# Patient Record
Sex: MALE | Race: WHITE
[De-identification: names, ages, dates, MRNs, and addresses within clinical notes are randomized per-mention and may not be internally consistent; named-entity substitution may affect disease eponyms.]

---
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
## 2021-03-01 NOTE — RAD REPORT
EXAM DESCRIPTION:  CTAbdomen   Pelvis W Contrast - 3/1/2021 3:42 pm

 

CLINICAL HISTORY:  Abdominal pain.

epigastric pain, vomiting, diarrhea

 

COMPARISON:  No comparisons

 

TECHNIQUE:  Biphasic CT imaging of the abdomen and pelvis was performed with 100 ml non-ionic IV cont
rast.

 

All CT scans are performed using dose optimization technique as appropriate and may include automated
 exposure control or mA/KV adjustment according to patient size.

 

FINDINGS:  The lung bases are clear.

 

The liver, spleen, pancreas, adrenal glands and kidneys are within normal limits.

 

No bowel obstruction, free air, free fluid or abscess.  The appendix is normal.  No evidence of signi
ficant lymphadenopathy.

 

No suspicious bony findings.

 

IMPRESSION:  No acute intra-abdominal or pelvic finding.

## 2021-03-01 NOTE — ER
Nurse's Notes                                                                                     

 [facility]                                                                 

Name: Lisandro Pierce                                                                             

Age: 20 yrs                                                                                       

Sex: Male                                                                                         

: 2000                                                                                   

MRN: [id number]                                                                                   

Arrival Date: 2021                                                                          

Time: 14:27                                                                                       

Account#: [id number]                                                                            

Bed 25                                                                                            

Private MD:                                                                                       

Diagnosis: Upper abdominal pain, unspecified                                                      

                                                                                                  

Presentation:                                                                                     

                                                                                             

14:42 Chief complaint: Patient states: Upper abdominal pain > 1 week. As soon as after I eat  ca1 

      I get diarrhea and vomiting. Coronavirus screen: Client denies travel out of the [place]       

      in the last 14 days. diarrhea, vomiting. Client presents with at least one sign or          

      symptom that may indicate coronavirus-19. Standard/surgical mask placed on the client.      

      Provider contacted for isolation considerations. Ebola Screen: Patient negative for         

      fever greater than or equal to 101.5 degrees Fahrenheit, and additional compatible          

      Ebola Virus Disease symptoms Patient denies exposure to infectious person. Patient          

      denies travel to an Ebola-affected area in the 21 days before illness onset. No             

      symptoms or risks identified at this time. Initial Sepsis Screen: Does the patient meet     

      any 2 criteria? No. Patient's initial sepsis screen is negative. Does the patient have      

      a suspected source of infection? No. Patient's initial sepsis screen is negative. Risk      

      Assessment: Do you want to hurt yourself or someone else? Patient reports no desire to      

      harm self or others. Onset of symptoms was 2021.                                  

14:42 Method Of Arrival: Ambulatory                                                           ca1 

14:42 Acuity: ADELINA 3                                                                           ca1 

                                                                                                  

Historical:                                                                                       

- Allergies:                                                                                      

14:43 No Known Allergies;                                                                     ca1 

- Home Meds:                                                                                      

14:43 None [Active];                                                                          ca1 

- PMHx:                                                                                           

14:43 Hypertension;                                                                           ca1 

- PSHx:                                                                                           

14:43 None;                                                                                   ca1 

                                                                                                  

- Immunization history:: Adult Immunizations unknown.                                             

- Social history:: Smoking status: Reported history of juuling and/or vaping.                     

                                                                                                  

                                                                                                  

Screening:                                                                                        

15:23 Abuse screen: Denies threats or abuse. Denies injuries from another. Nutritional        zb  

      screening: Has had N/V for 3 or more days. Tuberculosis screening: No symptoms or risk      

      factors identified. Fall Risk None identified.                                              

                                                                                                  

Assessment:                                                                                       

15:20 General: Appears in no apparent distress. uncomfortable, Behavior is calm, cooperative, zb  

      appropriate for age, Reports feeling ill for > 3 days, Denies fever, fatigue, chills.       

      Pain: Complains of pain in left upper quadrant and right upper quadrant and epigastric      

      area Pain does not radiate. Quality of pain is described as aching, tender, Pain began      

      last Monday Aggravated by eating, drinking. Neuro: Level of Consciousness is awake,         

      alert, obeys commands, Oriented to person, place, time, situation. Cardiovascular:          

      Reports None Heart tones S1 S2 present Capillary refill < 3 seconds in bilateral            

      Patient's skin is warm and dry. Rhythm is regular. Respiratory: Airway is patent            

      Respiratory effort is even, unlabored, Respiratory pattern is regular, symmetrical,         

      Breath sounds are clear bilaterally. Denies cough. GI: Abdomen is flat, Bowel sounds        

      present X 4 quads. hyperactive in right upper quadrant, left upper quadrant, right          

      lower quadrant and left lower quadrant Abdomen is tender to palpation in epigastric         

      area, right upper quadrant and left upper quadrant Reports upper abdominal pain,            

      diarrhea, intolerance of fluids, intolerance of food, nausea, vomiting, since Last          

      Monday. : No signs and/or symptoms were reported regarding the genitourinary system.      

      EENT: No signs and/or symptoms were reported regarding the EENT system. Derm: Skin is       

      intact, is healthy with good turgor, Skin is dry, Skin is normal, Skin temperature is       

      warm. Musculoskeletal: Circulation, motion, and sensation intact. Range of motion:          

      intact in all extremities.                                                                  

16:16 Reassessment: Patient appears in no apparent distress at this time. Patient and/or      zb  

      family updated on plan of care and expected duration. Pain level reassessed. Patient is     

      alert, oriented x 3, equal unlabored respirations, skin warm/dry/pink. no changes at        

      this time. IV fluids continue to infuse.                                                    

16:41 Reassessment: patient gait even and steady. d/c instruction given no questions at this  zb  

      time.                                                                                       

                                                                                                  

Vital Signs:                                                                                      

14:42  / 81; Pulse 96; Resp 16 S; Temp 98(TE); Pulse Ox 100% on R/A; Weight 113.4 kg    ca1 

      (R); Height 6 ft. 2 in. (187.96 cm) (R); Pain 2/10;                                         

16:16  / 74; Pulse 69; Resp 16; Pulse Ox 100% on R/A;                                   zb  

14:42 Body Mass Index 32.10 (113.40 kg, 187.96 cm)                                            ca1 

                                                                                                  

ED Course:                                                                                        

14:27 Patient arrived in ED.                                                                  ds1 

14:43 Triage completed.                                                                       ca1 

14:43 Arm band placed on right wrist.                                                         ca1 

14:44 Meliton Centeno PA is PHCP.                                                              [facility] 

14:44 Pepe Astorga MD is Attending Physician.                                                [facility] 

14:50 Caron Madrid, MARCK is Primary Nurse.                                                   zb  

15:20 Inserted saline lock: 20 gauge in left antecubital area, using aseptic technique. Blood zb  

      collected.                                                                                  

15:23 Patient has correct armband on for positive identification. Bed in low position. Call   zb  

      light in reach. Side rails up X 1. Pulse ox on. NIBP on. Door closed. Noise minimized.      

15:42 CT Abd/Pelvis - IV Contrast Only In Process Unspecified.                                EDMS

16:23 Terri Barnes MD is Referral Physician.                                                  [facility] 

16:41 No provider procedures requiring assistance completed. IV discontinued, intact,         zb  

      bleeding controlled, No redness/swelling at site. Pressure dressing applied.                

                                                                                                  

Administered Medications:                                                                         

15:20 Drug: NS 0.9% 1000 ml Route: IV; Rate: 1 bolus; Site: left antecubital;                 zb  

15:20 Drug: Zofran (Ondansetron) 4 mg Route: IVP; Site: left antecubital;                     zb  

                                                                                                  

                                                                                                  

Outcome:                                                                                          

16:24 Discharge ordered by MD.                                                                [facility] 

16:41 Discharged to home ambulatory.                                                          zb  

16:41 Condition: stable                                                                           

16:41 Discharge instructions given to patient, Instructed on discharge instructions, follow       

      up and referral plans. medication usage, Demonstrated understanding of instructions,        

      follow-up care, medications, Prescriptions given X 3.                                       

16:42 Patient left the ED.                                                                    zb  

                                                                                                  

Signatures:                                                                                       

Dispatcher [employer]                           EDMS                                                 

Meliton Centeno PA PA jmm Sanford, Demi                                ds1                                                  

Mindy See RN                        RN   ca1                                                  

Caron Madrid RN                     RN   zb                                                   

                                                                                                  

**************************************************************************************************

## 2021-03-01 NOTE — EDPHYS
Physician Documentation                                                                           

 [facility]                                                                 

Name: Lisandro Pierce                                                                             

Age: 20 yrs                                                                                       

Sex: Male                                                                                         

: 2000                                                                                   

MRN: [id number]                                                                                   

Arrival Date: 2021                                                                          

Time: 14:27                                                                                       

Account#: [id number]                                                                            

Bed 25                                                                                            

Private MD:                                                                                       

ED Physician Pepe Astorga                                                                         

HPI:                                                                                              

                                                                                             

14:50 This 20 yrs old  Male presents to ER via Ambulatory with complaints of         m 

      Abdominal Pain.                                                                             

14:50 The patient presents with abdominal pain. Onset: The symptoms/episode began/occurred    jmm 

      gradually, 1 week(s) ago. The symptoms do not radiate. Associated signs and symptoms:       

      Pertinent positives: nausea and vomiting, diarrhea. The symptoms are described as achy.     

      Modifying factors: The symptoms are alleviated by nothing, the symptoms are aggravated      

      by movement. The patient has not experienced similar symptoms in the past. Denies any       

      recent travel or abx use.                                                                   

                                                                                                  

Historical:                                                                                       

- Allergies:                                                                                      

14:43 No Known Allergies;                                                                     ca1 

- Home Meds:                                                                                      

14:43 None [Active];                                                                          ca1 

- PMHx:                                                                                           

14:43 Hypertension;                                                                           ca1 

- PSHx:                                                                                           

14:43 None;                                                                                   ca1 

                                                                                                  

- Immunization history:: Adult Immunizations unknown.                                             

- Social history:: Smoking status: Reported history of juuling and/or vaping.                     

                                                                                                  

                                                                                                  

ROS:                                                                                              

14:50 Constitutional: Negative for fever, chills, and weight loss, Cardiovascular: Negative   jmm 

      for chest pain, palpitations, and edema, Respiratory: Negative for shortness of breath,     

      cough, wheezing, and pleuritic chest pain.                                                  

14:50 Abdomen/GI: Positive for abdominal pain, nausea and vomiting, diarrhea.                     

14:50 All other systems are negative.                                                             

                                                                                                  

Exam:                                                                                             

14:50 Constitutional:  This is a well developed, well nourished patient who is awake, alert,  jmm 

      and in no acute distress. Head/Face:  atraumatic. Eyes:  EOMI, no conjunctival erythema     

      appreciated ENT:  Moist Mucus Membranes Neck:  Trachea midline, Supple Chest/axilla:        

      Normal chest wall appearance and motion.   Cardiovascular:  Regular rate and rhythm.        

      No edema appreciated Respiratory:  Normal respirations, no respiratory distress             

      appreciated                                                                                 

14:50 Back:  Normal ROM Skin:  General appearance color normal MS/ Extremity:  Moves all          

      extremities, no obvious deformities appreciated, no edema noted to the lower                

      extremities  Neuro:  Awake and alert, normal gait Psych:  Behavior is normal, Mood is       

      normal, Patient is cooperative and pleasant                                                 

14:50 Abdomen/GI: Inspection: abdomen appears normal, Bowel sounds: normal, Palpation: soft,      

      mild abdominal tenderness, in the epigastric area, right upper quadrant and left upper      

      quadrant.                                                                                   

                                                                                                  

Vital Signs:                                                                                      

14:42  / 81; Pulse 96; Resp 16 S; Temp 98(TE); Pulse Ox 100% on R/A; Weight 113.4 kg    ca1 

      (R); Height 6 ft. 2 in. (187.96 cm) (R); Pain 2/10;                                         

16:16  / 74; Pulse 69; Resp 16; Pulse Ox 100% on R/A;                                   zb  

14:42 Body Mass Index 32.10 (113.40 kg, 187.96 cm)                                            ca1 

                                                                                                  

MDM:                                                                                              

14:46 Patient medically screened.                                                             [facility] 

16:22 Data reviewed: vital signs, nurses notes. Counseling: I had a detailed discussion with  danita 

      the patient and/or guardian regarding: the historical points, exam findings, and any        

      diagnostic results supporting the discharge/admit diagnosis, lab results, radiology         

      results, the need for outpatient follow up, to return to the emergency department if        

      symptoms worsen or persist or if there are any questions or concerns that arise at          

      home. ED course: Patient is alert and non toxic in appearance in the ED. CT negative.       

      Patient is given strict return precautions. patient understood and agrees with the plan     

      of care. .                                                                                  

                                                                                                  

                                                                                             

14:50 Order name: Basic Metabolic Panel; Complete Time: 16:19                                 [facility] 

                                                                                             

14:50 Order name: CBC with Diff; Complete Time: 16:11                                         [facility] 

                                                                                             

14:50 Order name: Hepatic Function; Complete Time: 16:19                                      [facility] 

                                                                                             

14:50 Order name: Lipase; Complete Time: 16:19                                                [facility] 

                                                                                             

14:50 Order name: CT Abd/Pelvis - IV Contrast Only; Complete Time: 16:15                      [facility] 

                                                                                             

15:44 Order name: Urine Dipstick--Ancillary (enter results); Complete Time: 15:57             [facility] 

                                                                                             

14:50 Order name: IV Saline Lock; Complete Time: 15:20                                        [facility] 

                                                                                             

14:50 Order name: Labs collected and sent; Complete Time: 15:20                               [facility] 

                                                                                             

14:50 Order name: Urine Dipstick-Ancillary (obtain specimen); Complete Time: 15:31            [facility] 

                                                                                                  

Administered Medications:                                                                         

15:20 Drug: NS 0.9% 1000 ml Route: IV; Rate: 1 bolus; Site: left antecubital;                 zb  

15:20 Drug: Zofran (Ondansetron) 4 mg Route: IVP; Site: left antecubital;                     zb  

                                                                                                  

                                                                                                  

Disposition:                                                                                      

18:32 Co-signature as Attending Physician, Pepe Astorga MD.                                    rn  

                                                                                                  

Disposition:                                                                                      

21 16:24 Discharged to Home. Impression: Upper abdominal pain, unspecified.                 

- Condition is Stable.                                                                            

- Discharge Instructions: Abdominal Pain, Adult.                                                  

- Prescriptions for Zofran ODT 4 mg Oral tablet,disintegrating - place 1 tablet by                

  TRANSLINGUAL route every 4-6 hours; 20 tablet. Bentyl 20 mg Oral Tablet - take 2                

  tablet by ORAL route every 6 hours As needed; 40 tablet. Pepcid 20 mg Oral Tablet -             

  take 1 tablet by ORAL route every 12 hours for 10 days; 20 tablet.                              

- Medication Reconciliation Form, Thank You Letter, Antibiotic Education, Prescription            

  Opioid Use form.                                                                                

- Follow up: Terri Barnes MD; When: 2 - 3 days; Reason: Recheck today's complaints,               

  Continuance of care, Re-evaluation by your physician.                                           

                                                                                                  

                                                                                                  

                                                                                                  

Signatures:                                                                                       

Dispatcher [employer]                           EDMS                                                 

Meliton Centeno PA PA jmm Nieto, Roman, MD MD rn Acob, MARCK Guillen RN, Zipporah, RN RN zb                                                   

                                                                                                  

Corrections: (The following items were deleted from the chart)                                    

16:42 16:24 2021 16:24 Discharged to Home. Impression: Upper abdominal pain,            zb  

      unspecified. Condition is Stable. Forms are Medication Reconciliation Form, Thank You       

      Letter, Antibiotic Education, Prescription Opioid Use. Follow up: Terri aBrnes; When: 2 -     

      3 days; Reason: Recheck today's complaints, Continuance of care, Re-evaluation by your      

      physician. rose                                                                              

                                                                                                  

**************************************************************************************************

## 2021-04-29 NOTE — EDPHYS
Physician Documentation                                                                           

 [facility]                                                                 

Name: Lisandro Pierce                                                                             

Age: 20 yrs                                                                                       

Sex: Male                                                                                         

: 2000                                                                                   

MRN: [id number]                                                                                   

Arrival Date: 2021                                                                          

Time: 00:50                                                                                       

Account#: [id number]                                                                            

Bed 8                                                                                             

Private MD:                                                                                       

ED Physician Jorge Howard                                                                             

HPI:                                                                                              

                                                                                             

02:38 This 20 yrs old  Male presents to ER via Ambulatory with complaints of Sore    pkl 

      Throat.                                                                                     

02:38 The patient describes throat pain as constant. Onset: The symptoms/episode              pkl 

      began/occurred 1 week(s) ago.                                                               

                                                                                                  

Historical:                                                                                       

- Allergies:                                                                                      

01:07 No Known Allergies;                                                                     tl1 

- Home Meds:                                                                                      

01:07 None [Active];                                                                          tl1 

- PMHx:                                                                                           

01:07 Hypertension;                                                                           tl1 

- PSHx:                                                                                           

01:07 None;                                                                                   tl1 

                                                                                                  

- Immunization history:: Adult Immunizations up to date.                                          

- Social history:: Smoking status: Patient denies any tobacco usage or history of.                

                                                                                                  

                                                                                                  

ROS:                                                                                              

02:38 Eyes: Negative for injury, pain, redness, and discharge.                                pkl 

02:38 ENT: Positive for sore throat.                                                              

02:38 Neck: Negative for stiffness.                                                               

02:38 Cardiovascular: Negative for chest pain.                                                    

02:38 Respiratory: Negative for cough, shortness of breath.                                       

02:38 Abdomen/GI: Negative for abdominal pain, nausea, vomiting, and diarrhea.                    

02:38 Back: Negative for acute changes.                                                           

02:38 : Negative for urinary symptoms.                                                          

02:38 MS/extremity: Negative for acute changes.                                                   

02:38 Skin: Negative for rash.                                                                    

02:38 Neuro: Negative for altered mental status.                                                  

                                                                                                  

Exam:                                                                                             

02:38 Head/Face:  Normocephalic, atraumatic. Eyes:  Pupils equal round and reactive to light, pkl 

      extra-ocular motions intact.  Lids and lashes normal.  Conjunctiva and sclera are           

      non-icteric and not injected.  Cornea within normal limits.  Periorbital areas with no      

      swelling, redness, or edema.                                                                

02:38 ENT: Posterior pharynx: erythema, that is mild.                                             

02:38 Neck: Exam negative for nuchal rigidity.                                                    

02:38 Chest/axilla: Exam negative for acute changes.                                              

02:38 Cardiovascular: Rate: normal, Rhythm: regular.                                              

02:38 Respiratory: the patient does not display signs of respiratory distress,  Respirations:     

      normal, Breath sounds: are clear throughout.                                                

02:38 Abdomen/GI: Bowel sounds: normal, Palpation: abdomen is soft and non-tender, in all         

      quadrants.                                                                                  

02:38 Back: Exam negative for acute changes.                                                      

02:38 : Exam negative for acute changes.                                                        

02:38 Musculoskeletal/extremity: Exam is negative for acute changes.                              

02:38 Skin: Exam negative for rash.                                                               

02:38 Neuro: Orientation: is normal, Mentation: is normal, Cranial nerves: grossly normal,        

      Motor: is normal.                                                                           

                                                                                                  

Vital Signs:                                                                                      

01:06  / 85; Pulse 89; Resp 16; Temp 98.1; Pulse Ox 98% ; Weight 117.93 kg; Height 6    tl1 

      ft. 2 in. (187.96 cm); Pain 4/10;                                                           

02:30  / 86; Pulse 90; Resp 16; Pulse Ox 98% on R/A;                                    jb4 

01:06 Body Mass Index 33.38 (117.93 kg, 187.96 cm)                                            tl1 

                                                                                                  

MDM:                                                                                              

01:45 Patient medically screened.                                                             pkl 

02:38 Data reviewed: vital signs, nurses notes, lab test result(s).                           pk 

                                                                                                  

                                                                                             

01:12 Order name: Flu                                                                         tl1 

                                                                                             

01:13 Order name: Group A Streptococcus Rapid Sc; Complete Time: 02:43                        EDMS

                                                                                             

02:18 Order name: COVID-19/FLU A+B; Complete Time: 02:37                                      EDMS

                                                                                             

02:39 Order name: Throat Culture                                                              EDMS

                                                                                                  

Administered Medications:                                                                         

02:52 Drug: Acyclovir 800 mg Route: PO;                                                       jb4 

02:52 Follow up: Response: Medication administered at discharge.                              jb4 

                                                                                                  

                                                                                                  

Disposition:                                                                                      

21 02:42 Discharged to Home. Impression: Pharyngitis.                                       

- Condition is Stable.                                                                            

                                                                                                  

- Prescriptions for Acyclovir 400 mg Oral Tablet - take 1 tablet by ORAL route 3 times            

  per day; 20 tablet.                                                                             

- Medication Reconciliation Form, Thank You Letter, Antibiotic Education, Prescription            

  Opioid Use form.                                                                                

- Follow up: Private Physician; When: 2 - 3 days; Reason: Re-evaluation by your                   

  physician.                                                                                      

- Problem is new.                                                                                 

- Symptoms are unchanged.                                                                         

                                                                                                  

                                                                                                  

                                                                                                  

Signatures:                                                                                       

Dispatcher [employer]                           EDMS                                                 

Jorge Howard MD MD   pkl                                                  

Alisa Pizano RN                      RN   tl1                                                  

Ted Ramírez RN                       RN   jb4                                                  

                                                                                                  

Corrections: (The following items were deleted from the chart)                                    

01:35 01:13 Group A Streptococcus Rapid Sc+[id number].LAB.[id number] ordered. EDMS                           EDMS

01:36 01:13 Influenza Screen (A ordered. EDMS                                                 EDMS

02:55 02:42 2021 02:42 Discharged to Home. Impression: Pharyngitis. Condition is        jb4 

      Stable. Forms are Medication Reconciliation Form, Thank You Letter, Antibiotic              

      Education, Prescription Opioid Use. Follow up: Private Physician; When: 2 - 3 days;         

      Reason: Re-evaluation by your physician. Problem is new. Symptoms are unchanged. pkl        

                                                                                                  

**************************************************************************************************

## 2021-04-29 NOTE — ER
Nurse's Notes                                                                                     

 [facility]                                                                 

Name: Lisandro Pierce                                                                             

Age: 20 yrs                                                                                       

Sex: Male                                                                                         

: 2000                                                                                   

MRN: [id number]                                                                                   

Arrival Date: 2021                                                                          

Time: 00:50                                                                                       

Account#: [id number]                                                                            

Bed 8                                                                                             

Private MD:                                                                                       

Diagnosis: Pharyngitis                                                                            

                                                                                                  

Presentation:                                                                                     

                                                                                             

01:05 Chief complaint: Patient states: sore throat for several days. Coronavirus screen:      tl1 

      Client denies travel out of the [place] in the last 14 days. At this time, the client does     

      not indicate any symptoms associated with coronavirus-19. Ebola Screen: Patient             

      negative for fever greater than or equal to 101.5 degrees Fahrenheit, and additional        

      compatible Ebola Virus Disease symptoms Patient denies exposure to infectious person.       

      Patient denies travel to an Ebola-affected area in the 21 days before illness onset.        

      Initial Sepsis Screen: Does the patient meet any 2 criteria? No. Patient's initial          

      sepsis screen is negative. Does the patient have a suspected source of infection? No.       

      Patient's initial sepsis screen is negative. Risk Assessment: Do you want to hurt           

      yourself or someone else? Patient reports no desire to harm self or others. Onset of        

      symptoms is unknown.                                                                        

01:05 Method Of Arrival: Ambulatory                                                           tl1 

01:05 Acuity: ADELINA 4                                                                           tl1 

                                                                                                  

Historical:                                                                                       

- Allergies:                                                                                      

01:07 No Known Allergies;                                                                     tl1 

- Home Meds:                                                                                      

01:07 None [Active];                                                                          tl1 

- PMHx:                                                                                           

01:07 Hypertension;                                                                           tl1 

- PSHx:                                                                                           

01:07 None;                                                                                   tl1 

                                                                                                  

- Immunization history:: Adult Immunizations up to date.                                          

- Social history:: Smoking status: Patient denies any tobacco usage or history of.                

                                                                                                  

                                                                                                  

Screenin:09 Abuse screen: Denies threats or abuse. Denies injuries from another. Nutritional        tl1 

      screening: No deficits noted. Tuberculosis screening: No symptoms or risk factors           

      identified. Fall Risk None identified.                                                      

                                                                                                  

Assessment:                                                                                       

01:08 General: Appears in no apparent distress. Pain: Complains of pain in left aspect of     tl1 

      posterior pharynx and right aspect of posterior pharynx Pain currently is 4 out of 10       

      on a pain scale. Aggravated by swallowing. Neuro: No deficits noted. Cardiovascular:        

      Denies chest pain. Respiratory: Airway is patent Respiratory effort is even, unlabored,     

      Breath sounds are clear bilaterally. GI: No signs and/or symptoms were reported             

      involving the gastrointestinal system. : No signs and/or symptoms were reported           

      regarding the genitourinary system. EENT: Throat is reddened Reports pain when              

      swallowing. Derm: No deficits noted. Musculoskeletal: No deficits noted.                    

02:53 Reassessment: Patient appears in no apparent distress at this time. Patient and/or      jb4 

      family updated on plan of care and expected duration. Pain level reassessed. Patient is     

      alert, oriented x 3, equal unlabored respirations, skin warm/dry/pink.                      

                                                                                                  

Vital Signs:                                                                                      

01:06  / 85; Pulse 89; Resp 16; Temp 98.1; Pulse Ox 98% ; Weight 117.93 kg; Height 6    tl1 

      ft. 2 in. (187.96 cm); Pain 4/10;                                                           

02:30  / 86; Pulse 90; Resp 16; Pulse Ox 98% on R/A;                                    jb4 

01:06 Body Mass Index 33.38 (117.93 kg, 187.96 cm)                                            tl1 

                                                                                                  

ED Course:                                                                                        

00:50 Patient arrived in ED.                                                                  cf2 

01:06 Triage completed.                                                                       tl1 

01:08 Arm band placed on right wrist.                                                         tl1 

01:09 Jared Coronel, RN is Primary Nurse.                                                       

01:09 Patient has correct armband on for positive identification. Bed in low position. Call     

      light in reach. Side rails up X 1. Pulse ox on. NIBP on.                                    

01:45 Jorge Howard MD is Attending Physician.                                                    pkl 

02:30 No provider procedures requiring assistance completed. Patient did not have IV access   jb4 

      during this emergency room visit.                                                           

                                                                                                  

Administered Medications:                                                                         

02:52 Drug: Acyclovir 800 mg Route: PO;                                                       jb4 

02:52 Follow up: Response: Medication administered at discharge.                              jb4 

                                                                                                  

                                                                                                  

Outcome:                                                                                          

02:42 Discharge ordered by MD.                                                                pkl 

02:54 Discharged to home ambulatory.                                                          jb4 

02:54 Condition: stable                                                                           

02:54 Discharge instructions given to patient, Instructed on discharge instructions, follow       

      up and referral plans. medication usage, Demonstrated understanding of instructions,        

      follow-up care, medications, Prescriptions given X 1.                                       

02:55 Patient left the ED.                                                                    jb4 

                                                                                                  

Signatures:                                                                                       

Jorge Howard MD MD   pkAlisa Caballero RN RN   tl1                                                  

Ted Ramírez RN RN   jb4                                                  

Jared Coronel RN RN                                                      

Richter, Celesta                             cf2                                                  

                                                                                                  

**************************************************************************************************

## 2022-03-15 NOTE — ER
Nurse's Notes                                                                                     

 [facility]                                                                 

Name: Lisandro Pierce                                                                             

Age: 21 yrs                                                                                       

Sex: Male                                                                                         

: 2000                                                                                   

MRN: [id number]                                                                                   

Arrival Date: 03/15/2022                                                                          

Time: 15:05                                                                                       

Account#: [id number]                                                                            

Bed 8                                                                                             

Private MD:                                                                                       

Diagnosis: Influenza due to identified novel influenza A virus with other respiratory             

  manifestations                                                                                  

                                                                                                  

Presentation:                                                                                     

03/15                                                                                             

15:16 Chief complaint: Patient states: "I am sick. I've been coughing, fever, sore throat,    ab2 

      body aches and chills for 3 days." Pt denies n/v/d. Coronavirus screen: Vaccine status:     

      Patient reports receiving the 2nd dose of the covid vaccine. chills, cough unrelated to     

      allergies, fatigue, fever, runny nose, shortness of breath, sore throat, At this time,      

      the client does not indicate any symptoms associated with coronavirus-19. Ebola Screen:     

      Patient negative for fever greater than or equal to 101.5 degrees Fahrenheit, and           

      additional compatible Ebola Virus Disease symptoms Patient denies exposure to               

      infectious person. Patient denies travel to an Ebola-affected area in the 21 days           

      before illness onset. No symptoms or risks identified at this time. Initial Sepsis          

      Screen: Does the patient meet any 2 criteria? No. Patient's initial sepsis screen is        

      negative. Does the patient have a suspected source of infection? No. Patient's initial      

      sepsis screen is negative. Risk Assessment: Do you want to hurt yourself or someone         

      else? Patient reports no desire to harm self or others. Onset of symptoms is unknown.       

15:16 Method Of Arrival: Ambulatory                                                           ab2 

15:16 Acuity: ADELINA 4                                                                           ab2 

                                                                                                  

Triage Assessment:                                                                                

15:19 General: Appears in no apparent distress. comfortable, Behavior is calm, cooperative,   ab2 

      appropriate for age. Pain: Complains of pain in neck. EENT: Reports pain when               

      swallowing. Respiratory: Reports shortness of breath cough that is Airway is patent         

      Respiratory effort is even, unlabored, Respiratory pattern is regular, symmetrical.         

      Musculoskeletal: Reports body aches.                                                        

                                                                                                  

Historical:                                                                                       

- Allergies:                                                                                      

15:19 No Known Allergies;                                                                     ab2 

- Home Meds:                                                                                      

15:19 None [Active];                                                                          ab2 

- PMHx:                                                                                           

15:19 Hypertension;                                                                           ab2 

- PSHx:                                                                                           

15:19 None;                                                                                   ab2 

                                                                                                  

- Immunization history:: Adult Immunizations up to date.                                          

- Social history:: Smoking status: Patient denies any tobacco usage or history of.                

                                                                                                  

                                                                                                  

Screenin:07 Abuse screen: Denies threats or abuse. Nutritional screening: No deficits noted.        vg1 

      Tuberculosis screening: No symptoms or risk factors identified. Fall Risk No fall in        

      past 12 months (0 pts). No secondary diagnosis (0 pts). No IV (0 pts). Ambulatory Aid-      

      None/Bed Rest/Nurse Assist (0 pts). Gait- Normal/Bed Rest/Wheelchair (0 pts) Mental         

      Status- Oriented to own ability (0 pts). Total Stephenson Fall Scale indicates No Risk (0-24     

      pts).                                                                                       

                                                                                                  

Assessment:                                                                                       

16:05 General: Appears in no apparent distress. comfortable, Behavior is calm, cooperative.   vg1 

      Pain: Complains of pain in throat Pain currently is 3 out of 10 on a pain scale. Pain       

      began 2-3 days ago. Neuro: Level of Consciousness is awake, alert, obeys commands,          

      Oriented to person, place, time, situation. Cardiovascular: Patient's skin is warm and      

      dry. Respiratory: Reports cough that is Airway is patent Respiratory effort is even,        

      unlabored. GI: Patient currently denies diarrhea, nausea, vomiting. : No signs and/or     

      symptoms were reported regarding the genitourinary system. EENT: No signs and/or            

      symptoms were reported regarding the EENT system. Derm: Skin is intact, is healthy with     

      good turgor. Musculoskeletal: Circulation, motion, and sensation intact.                    

17:14 Reassessment: Patient appears in no apparent distress at this time. No changes from     vg1 

      previously documented assessment. Patient and/or family updated on plan of care and         

      expected duration. Pain level reassessed. Patient is alert, oriented x 3, equal             

      unlabored respirations, skin warm/dry/pink.                                                 

                                                                                                  

Vital Signs:                                                                                      

15:16  / 91; Pulse 119; Resp 18; Temp 97.9(TE); Pulse Ox 99% on R/A; Weight 113.4 kg;   ab2 

      Height 6 ft. 2 in. (187.96 cm); Pain 3/10;                                                  

16:08  / 84; Pulse 110; Resp 16; Pulse Ox 98% on R/A;                                   vg1 

17:10  / 84; Pulse 105; Resp 18; Pulse Ox 99% on R/A;                                   vg1 

15:16 Body Mass Index 32.10 (113.40 kg, 187.96 cm)                                            ab2 

                                                                                                  

ED Course:                                                                                        

15:05 Patient arrived in ED.                                                                  mr  

15:15 Ishmael Gaspar PA is PHCP.                                                                cp  

15:15 Kam Chan DO is Attending Physician.                                                cp  

15:19 Triage completed.                                                                       ab2 

15:20 Arm band placed on right wrist.                                                         ab2 

16:01 XRAY Chest Pa And Lat (2 Views) In Process Unspecified.                                 EDMS

16:05 Carina Chaney, RN is Primary Nurse.                                                  vg1 

16:07 Patient has correct armband on for positive identification. Bed in low position. Call   vg1 

      light in reach. Adult w/ patient.                                                           

16:07 No provider procedures requiring assistance completed. Patient did not have IV access   vg1 

      during this emergency room visit.                                                           

                                                                                                  

Administered Medications:                                                                         

No medications were administered                                                                  

                                                                                                  

                                                                                                  

Outcome:                                                                                          

17:02 Discharge ordered by MD.                                                                cp  

17:14 Discharged to home ambulatory, with family.                                             vg1 

17:14 Condition: good                                                                             

17:14 Discharge instructions given to patient, Instructed on discharge instructions, follow       

      up and referral plans. medication usage, Demonstrated understanding of instructions,        

      follow-up care, medications, Prescriptions given X 2.                                       

17:15 Patient left the ED.                                                                    vg1 

                                                                                                  

Signatures:                                                                                       

Dispatcher [employer]                           EDMS                                                 

Louis Marina                                 mr                                                   

Ishmael Gaspar PA PA cp Garcia, Victoria, RN                    RN   vg1                                                  

Marvel Martin                           ab2                                                  

                                                                                                  

**************************************************************************************************

## 2022-03-15 NOTE — RAD REPORT
EXAM DESCRIPTION:  RAD - Chest Pa And Lat (2 Views) - 3/15/2022 4:02 pm

 

CLINICAL HISTORY:  COUGH

Chest pain.

 

COMPARISON:  [facility] dated 11/12/2015

 

FINDINGS:  The lungs are clear. The heart is normal in size. No displaced fractures.

 

IMPRESSION:  No acute or concerning finding suspected.

## 2022-03-15 NOTE — EDPHYS
Physician Documentation                                                                           

 [facility]                                                                 

Name: Lisandro Pierce                                                                             

Age: 21 yrs                                                                                       

Sex: Male                                                                                         

: 2000                                                                                   

MRN: [id number]                                                                                   

Arrival Date: 03/15/2022                                                                          

Time: 15:05                                                                                       

Account#: [id number]                                                                            

Bed 8                                                                                             

Private MD:                                                                                       

ED Physician Kam Chan                                                                         

HPI:                                                                                              

03/15                                                                                             

15:35 This 21 yrs old Male presents to ER via Ambulatory with complaints of Fever, Cough.     cp  

15:35 The patient reports fever, not measured (subjective).                                   cp  

15:35 Onset: The symptoms/episode began/occurred 3 day(s) ago. Associated signs and symptoms: cp  

      Pertinent positives: cough, sore throat, body aches, Pertinent negatives: abdominal         

      pain, chest pain, diarrhea, vomiting.                                                       

                                                                                                  

Historical:                                                                                       

- Allergies:                                                                                      

15:19 No Known Allergies;                                                                     ab2 

- Home Meds:                                                                                      

15:19 None [Active];                                                                          ab2 

- PMHx:                                                                                           

15:19 Hypertension;                                                                           ab2 

- PSHx:                                                                                           

15:19 None;                                                                                   ab2 

                                                                                                  

- Immunization history:: Adult Immunizations up to date.                                          

- Social history:: Smoking status: Patient denies any tobacco usage or history of.                

                                                                                                  

                                                                                                  

ROS:                                                                                              

15:40 Constitutional: Positive for body aches, chills, Negative for fever, poor PO intake.    cp  

15:40 Eyes: Negative for injury, pain, redness, and discharge.                                cp  

15:40 ENT: Negative for ear pain, sore throat, difficulty swallowing, difficulty handling         

      secretions.                                                                                 

15:40 Cardiovascular: Negative for chest pain, palpitations.                                      

15:40 Respiratory: Positive for cough, Negative for shortness of breath, wheezing.                

15:40 Abdomen/GI: Negative for abdominal pain, nausea, vomiting, and diarrhea.                    

15:40 Neuro: Negative for altered mental status, headache, weakness.                          cp  

15:40 All other systems are negative.                                                         cp  

                                                                                                  

Exam:                                                                                             

15:45 Constitutional: The patient appears in no acute distress, alert, awake, non-toxic, well cp  

      developed, well nourished.                                                                  

15:45 Head/Face:  Normocephalic, atraumatic.                                                  cp  

15:45 Eyes: Periorbital structures: appear normal, Conjunctiva: normal, no exudate, no            

      injection, Sclera: no appreciated abnormality, Lids and lashes: appear normal,              

      bilaterally.                                                                                

15:45 ENT: External ear(s): are unremarkable, Ear canal(s): are normal, clear, TM's:              

      dullness, bilaterally, Nose: is normal, Mouth: Lips: moist, Oral mucosa: moist,             

      Posterior pharynx: Airway: no evidence of obstruction, patent, Tonsils: no enlargement,     

      no exudate, erythema, that is mild, exudate, is not appreciated.                            

15:45 Neck: ROM/movement: is normal, is supple, no meningismus, no nuchal rigidity, Lymph         

      nodes: no appreciated lymphadenopathy.                                                      

15:45 Chest/axilla: Inspection: normal.                                                           

15:45 Cardiovascular: Rate: tachycardic, Rhythm: regular.                                         

15:45 Respiratory: the patient does not display signs of respiratory distress,  Respirations:     

      normal, no use of accessory muscles, no retractions, labored breathing, is not present,     

      Breath sounds: bronchial sounds, that are mild, are heard diffusely, decreased breath       

      sounds, are not appreciated, stridor, is not appreciated, wheezing: is not appreciated.     

15:45 Abdomen/GI: Exam negative for discomfort, distension, guarding, Inspection: abdomen         

      appears normal.                                                                             

15:45 Back: pain, is absent, ROM is normal.                                                       

15:45 Skin: no rash present.                                                                      

                                                                                                  

Vital Signs:                                                                                      

15:16  / 91; Pulse 119; Resp 18; Temp 97.9(TE); Pulse Ox 99% on R/A; Weight 113.4 kg;   ab2 

      Height 6 ft. 2 in. (187.96 cm); Pain 3/10;                                                  

16:08  / 84; Pulse 110; Resp 16; Pulse Ox 98% on R/A;                                   vg1 

17:10  / 84; Pulse 105; Resp 18; Pulse Ox 99% on R/A;                                   vg1 

15:16 Body Mass Index 32.10 (113.40 kg, 187.96 cm)                                            ab2 

                                                                                                  

MDM:                                                                                              

15:21 Patient medically screened.                                                             cp  

16:59 Data reviewed: vital signs, nurses notes, lab test result(s), radiologic studies, plain cp  

      films. Test interpretation: by ED physician or midlevel provider: plain radiologic          

      studies. Counseling: I had a detailed discussion with the patient and/or guardian           

      regarding: the historical points, exam findings, and any diagnostic results supporting      

      the discharge/admit diagnosis, lab results, radiology results. ED course: VSS. Patient      

      appears non-toxic and no signs of respiratory distress. Will discharge to home for          

      continued monitoring.                                                                       

                                                                                                  

03/15                                                                                             

15:29 Order name: COVID-19/FLU A+B (Document "Date of Onset" if Symptomatic)                  cp  

03/15                                                                                             

15:29 Order name: Strep; Complete Time: 16:29                                                 cp  

03/15                                                                                             

16:29 Interpretation: Reviewed.                                                               cp  

03/15                                                                                             

15:29 Order name: XRAY Chest Pa And Lat (2 Views); Complete Time: 16:17                       cp  

03/15                                                                                             

16:17 Interpretation: Report reviewed.                                                        cp  

03/15                                                                                             

16:22 Order name: Throat Culture                                                              EDMS

                                                                                                  

Administered Medications:                                                                         

No medications were administered                                                                  

                                                                                                  

                                                                                                  

Disposition:                                                                                      

21:44 Co-signature as Attending Physician, Kam Chan DO I agree with the assessment and     ms3 

      plan of care.                                                                               

                                                                                                  

Disposition Summary:                                                                              

03/15/22 17:02                                                                                    

Discharge Ordered                                                                                 

      Location: Home                                                                          cp  

      Problem: new                                                                            cp  

      Symptoms: are unchanged                                                                 cp  

      Condition: Stable                                                                       cp  

      Diagnosis                                                                                   

        - Influenza due to identified novel influenza A virus with other respiratory          cp  

      manifestations                                                                              

      Followup:                                                                               cp  

        - With: Private Physician                                                                  

        - When: 2 - 3 days                                                                         

        - Reason: Worsening of condition                                                           

      Discharge Instructions:                                                                     

        - Discharge Summary Sheet                                                             cp  

        - Influenza, Adult                                                                    cp  

      Forms:                                                                                      

        - Medication Reconciliation Form                                                      cp  

        - Thank You Letter                                                                    cp  

        - Antibiotic Education                                                                cp  

        - Prescription Opioid Use                                                             cp  

      Prescriptions:                                                                              

        - Ibuprofen 800 mg Oral Tablet                                                             

            - take 1 tablet by ORAL route every 8 hours As needed take with food; 30 tablet;  cp  

      Refills: 0, Product Selection Permitted                                                     

        - Tessalon Perles 100 mg Oral Capsule                                                      

            - take 1 capsule by ORAL route every 8 hours As needed; 15 capsule; Refills: 0,   cp  

      Product Selection Permitted                                                                 

Signatures:                                                                                       

Dispatcher [facility]                           EDMS                                                 

Ishmael Gaspar PA                         PA   cp                                                   

Kam Chan DO DO   ms3                                                  

Marvel Martin2                                                  

                                                                                                  

Corrections: (The following items were deleted from the chart)                                    

                                                                                             

01:58 03/15 15:40 Constitutional: Negative for body aches, chills, fever, poor PO intake, cp  cp  

                                                                                                  

**************************************************************************************************